# Patient Record
Sex: MALE | Race: WHITE
[De-identification: names, ages, dates, MRNs, and addresses within clinical notes are randomized per-mention and may not be internally consistent; named-entity substitution may affect disease eponyms.]

---

## 2019-04-21 ENCOUNTER — HOSPITAL ENCOUNTER (EMERGENCY)
Dept: HOSPITAL 25 - ED | Age: 34
Discharge: HOME | End: 2019-04-21
Payer: COMMERCIAL
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
## 2019-04-21 DIAGNOSIS — F17.210: ICD-10-CM

## 2019-04-21 DIAGNOSIS — S76.112A: Primary | ICD-10-CM

## 2019-04-21 PROCEDURE — 96374 THER/PROPH/DIAG INJ IV PUSH: CPT

## 2019-04-21 PROCEDURE — 99282 EMERGENCY DEPT VISIT SF MDM: CPT

## 2019-04-21 NOTE — ED
Lower Extremity





- HPI Summary


HPI Summary: 


This patient is a 33 year old M brought in by ambulance to [facility] with a chief 

complaint of left knee pain since 12:30. The patient notes the symptoms began 

after he landed when jumping playing basketball. The patient notes that his 

left kneecap was positioned higher than usual.  The patient notes that he 

originally could not feel his left foot until EMS straightened his leg and his 

kneecap was repositioned. The patient rates the pain 5/10 in severity. Symptoms 

aggravated by movement. Symptoms alleviated by nothing.








- History of Current Complaint


Stated Complaint: LEFT KNEE INJURY PER PT


Time Seen by Provider: 04/21/19 13:26


Hx Obtained From: Patient


Onset of Pain: Minutes


Onset/Duration: Still Present


Severity Initially: Mild


Severity Currently: Mild


Timing: Constant


Location: Is Discrete @ - left knee


Associated Signs And Symptoms: Positive: Knee Pain - left


Aggravating Factor(s): Movement


Alleviating Factor(s): Nothing





- Allergies/Home Medications


Allergies/Adverse Reactions: 


 Allergies











Allergy/AdvReac Type Severity Reaction Status Date / Time


 


No Known Allergies Allergy   Verified 10/12/15 09:47














PMH/Surg Hx/FS Hx/Imm Hx


Opthamlomology History: 


   Denies: Hx Legally Blind


EENT History: 


   Denies: Hx Deafness


Infectious Disease History: 


   Denies: Traveled Outside the [place] in Last 30 Days





- Family History


Known Family History: 


   Negative: Blood Disorder





- Social History


Alcohol Use: Occasionally


Substance Use Type: Reports: None


Smoking Status (MU): Current Every Day Smoker





Review of Systems


Negative: Fever


Negative: Epistaxis


Negative: Cough


Negative: Vomiting


Positive: Arthralgia - left knee pain


All Other Systems Reviewed And Are Negative: Yes





Physical Exam





- Summary


Physical Exam Summary: 


Appearance: The patient is well-nourished in no acute distress and in no acute 

pain.


 


Skin: The skin is warm and dry and skin color reflects adequate perfusion.





HEENT: The head is normocephalic and atraumatic. The pupils are equal and 

reactive. The conjunctivae are clear and without drainage. Nares are patent and 

without drainage. Mouth reveals moist mucous membranes and the throat is 

without erythema and exudate. The external ears are intact. The ear canals are 

patent and without drainage. The tympanic membranes are intact.


 


Neck: The neck is supple with full range of motion and non-tender. There are no 

carotid bruits. There is no neck vein distension.


 


Respiratory: Chest is non-tender. Lungs are clear to auscultation and breath 

sounds are symmetrical and equal.


 


Cardiovascular: Heart is regular rate and rhythm. There is no murmur or rub 

auscultated. There is no peripheral edema and pulses are symmetrical and equal.


 


Abdomen: The abdomen is soft and non-tender. There are normal bowel sounds 

heard in all four quadrants and there is no organomegaly palpated.


 


Musculoskeletal: There is no back tenderness noted. There is good capillary 

refill. There is no peripheral edema or calf tenderness elicited. Unable to 

lift his left leg off the bed. Infrapatellar defect palpated


 


Neurological: Patient is alert and oriented to person, place and time. The 

patient has symmetrical motor strength in all four extremities. Cranial nerves 

are grossly intact. Deep tendon reflexes are symmetrical and equal in all four 

extremities.


 


Psychiatric: The patient has an appropriate affect and does not exhibit any 

anxiety or depression.





Triage Information Reviewed: Yes


Vital Signs Reviewed: Yes





Diagnostics





- Laboratory


Lab Statement: Any lab studies that have been ordered have been reviewed, and 

results considered in the medical decision making process.





- Radiology


  ** Left Knee XR


Radiology Interpretation Completed By: Radiologist


Summary of Radiographic Findings: IMPRESSION: No fracture of the left knee is 

noted. Question of infrapatellar tendon.  injury. Soft tissue swelling is noted 

inferior to the patella.  Dr. Bergman has reviewed this report.





Lower Extremity Course/Dx





- Course


Course Of Treatment: Mr. Taylor has likely either partially or completely 

ruptured his infrapatellar tendon.  His post in a knee immobilizer and crutches 

and recommended follow-up with orthopedics.  He is given Dr. Steinberg's name but 

they live about an hour away and he will try to get set up with an orthopedist 

in his hometown.





- Diagnoses


Provider Diagnoses: 


 Patellar tendon rupture








Discharge





- Sign-Out/Discharge


Documenting (check all that apply): Patient Departure - discharge home


Patient Received Moderate/Deep Sedation with Procedure: No





- Discharge Plan


Condition: Stable


Disposition: HOME


Patient Education Materials:  Knee Pain (ED), Tendon Rupture (ED)


Referrals: 


Altaf MEEHAN,Alanna WAGONER [Medical Doctor] - 


Butch River MD [Medical Doctor] - 1 Day


Additional Instructions: 





Follow up with Dr. River, orthopedist, in 1-2 days. Return to the emergency 

with any new or worsening symptoms.





- Billing Disposition and Condition


Condition: STABLE


Disposition: Home





- Attestation Statements


Document Initiated by Robin: Yes


Documenting Scribe: Anne Cortez


Provider For Whom Robin is Documenting (Include Credential): Marcos Bergman MD


Scribe Attestation: 


Anne LUCIA, scribed for Marcos Bergman MD on 04/21/19 at 1811. 


Scribe Documentation Reviewed: Yes


Provider Attestation: 


The documentation as recorded by the scribe, Anne Cortez accurately 

reflects the service I personally performed and the decisions made by Marcos mosqueda MD


Status of Scribe Document: Viewed